# Patient Record
Sex: FEMALE | Race: WHITE | NOT HISPANIC OR LATINO | Employment: STUDENT | URBAN - METROPOLITAN AREA
[De-identification: names, ages, dates, MRNs, and addresses within clinical notes are randomized per-mention and may not be internally consistent; named-entity substitution may affect disease eponyms.]

---

## 2023-05-27 PROBLEM — J02.0 ACUTE STREPTOCOCCAL PHARYNGITIS: Status: RESOLVED | Noted: 2023-05-27 | Resolved: 2023-05-27

## 2023-05-27 PROBLEM — N92.0 HEAVY MENSTRUAL PERIOD: Status: RESOLVED | Noted: 2023-05-27 | Resolved: 2023-05-27

## 2023-05-27 PROBLEM — N39.0 UTI (URINARY TRACT INFECTION): Status: RESOLVED | Noted: 2023-05-27 | Resolved: 2023-05-27

## 2023-05-27 RX ORDER — NORGESTIMATE AND ETHINYL ESTRADIOL 0.25-0.035
1 KIT ORAL DAILY
COMMUNITY
Start: 2022-07-05 | End: 2023-06-06 | Stop reason: SDUPTHER

## 2023-05-30 ENCOUNTER — APPOINTMENT (OUTPATIENT)
Dept: PEDIATRICS | Facility: CLINIC | Age: 23
End: 2023-05-30
Payer: COMMERCIAL

## 2023-06-06 ENCOUNTER — OFFICE VISIT (OUTPATIENT)
Dept: PEDIATRICS | Facility: CLINIC | Age: 23
End: 2023-06-06
Payer: COMMERCIAL

## 2023-06-06 VITALS
SYSTOLIC BLOOD PRESSURE: 130 MMHG | WEIGHT: 265 LBS | BODY MASS INDEX: 39.25 KG/M2 | HEIGHT: 69 IN | DIASTOLIC BLOOD PRESSURE: 79 MMHG | HEART RATE: 61 BPM

## 2023-06-06 DIAGNOSIS — Z00.129 ENCOUNTER FOR ROUTINE CHILD HEALTH EXAMINATION WITHOUT ABNORMAL FINDINGS: Primary | ICD-10-CM

## 2023-06-06 PROCEDURE — 3008F BODY MASS INDEX DOCD: CPT | Performed by: PEDIATRICS

## 2023-06-06 PROCEDURE — 1036F TOBACCO NON-USER: CPT | Performed by: PEDIATRICS

## 2023-06-06 PROCEDURE — 99395 PREV VISIT EST AGE 18-39: CPT | Performed by: PEDIATRICS

## 2023-06-06 RX ORDER — NORGESTIMATE AND ETHINYL ESTRADIOL 0.25-0.035
1 KIT ORAL DAILY
Qty: 84 TABLET | Refills: 3 | Status: SHIPPED | OUTPATIENT
Start: 2023-06-06

## 2023-06-06 NOTE — PROGRESS NOTES
Chanda García is a 22 y.o. here for Wellness Exam    Here with   Herself    Parent/Patient Concerns:  none    Supplements: no    School:    [employer]  Academic performance: good  Peer relationships:  has friends, no issues  Extracurricular activities:  Job: [employer] in [employer]    Nutrition:  Balanced Diet:  yes  Breakfast  yes (egg/toast/cereal)  Lunch: yes at school   Beverages:  water  Fruits/Vegetables:  yes  Meats: yes    Dental: Brushes teeth:  2  times/d  Dental home: yes    Eyeglasses:  no    Safety:  seat belt: yes (drives)      Menstrual Hx:  OCP. Lasts 4-5 days, some cramps     Sexual Activity:  Yes (boyfriend 1 year+ condoms)  Tobacco/Vaping: No  Substance Use:  No      Exam:  General: Alert, interactive. Vital signs reviewed  Skin: no rash, no lesions  Eyes: no redness, no eye drainage, no eyelid swelling. Red Reflex OU, EOMI  Ears: TM right- normal color and landmarks  left- normal color and landmarks  Nose: patent without congestion or  drainage  Mouth/Throat: no lesion. Tonsils without redness or exudate. Symmetrical without enlargement.   Neck: supple, no palpable cervical nodes or masses  Chest: no deformity, swelling, mass, or lesion  Breasts: [place] 5  Lungs: clear to auscultation bilateral  CV: regular rate and rhythm, no murmur  Abdomen: soft, +bowel sounds. No mass, no hepatosplenomegaly, no tenderness to palpation  Extremities: no swelling or deformity. Muscle strength and tone normal x 4. Gait normal   Back: no swelling, no mass.  Scoliosis No   female:  not examined   Neuro:  Muscle strength and tone equal x 4 extremities.  Patellar reflexes equal bilateral.  Gait normal     ASSESSMENT:  Well Adolescent Exam  22  year old    PLAN:  School performance, peer/dating relationships, growth measurements and BMI%, nutrition, and age appropriate exercise reviewed at today's Health Maintenance Visit  Advised to limit  high sugar containing beverages (soda, juice, sports drinks) and excess caffeine  Avoid skipped meals  Avoid excess portions  Recommend a daily  multivitamin    Cleared for sports participation. Sports form reviewed and form signed  H/O Concussion  No     Schedule next Health Maintenance Exam in  1 year

## 2023-08-08 ENCOUNTER — OFFICE VISIT (OUTPATIENT)
Dept: PEDIATRICS | Facility: CLINIC | Age: 23
End: 2023-08-08
Payer: COMMERCIAL

## 2023-08-08 VITALS
DIASTOLIC BLOOD PRESSURE: 81 MMHG | HEART RATE: 66 BPM | SYSTOLIC BLOOD PRESSURE: 118 MMHG | HEIGHT: 68 IN | WEIGHT: 261 LBS | BODY MASS INDEX: 39.56 KG/M2

## 2023-08-08 DIAGNOSIS — F41.1 GENERALIZED ANXIETY DISORDER: Primary | ICD-10-CM

## 2023-08-08 DIAGNOSIS — F41.0 PANIC ATTACKS: ICD-10-CM

## 2023-08-08 PROCEDURE — 1036F TOBACCO NON-USER: CPT | Performed by: PEDIATRICS

## 2023-08-08 PROCEDURE — 99214 OFFICE O/P EST MOD 30 MIN: CPT | Performed by: PEDIATRICS

## 2023-08-08 PROCEDURE — 3008F BODY MASS INDEX DOCD: CPT | Performed by: PEDIATRICS

## 2023-08-08 PROCEDURE — 96127 BRIEF EMOTIONAL/BEHAV ASSMT: CPT | Performed by: PEDIATRICS

## 2023-08-08 RX ORDER — SERTRALINE HYDROCHLORIDE 25 MG/1
TABLET, FILM COATED ORAL
Qty: 30 TABLET | Refills: 1 | Status: SHIPPED | OUTPATIENT
Start: 2023-08-08 | End: 2023-09-11 | Stop reason: DRUGHIGH

## 2023-08-08 RX ORDER — HYDROXYZINE HYDROCHLORIDE 25 MG/1
TABLET, FILM COATED ORAL
Qty: 60 TABLET | Refills: 1 | Status: SHIPPED | OUTPATIENT
Start: 2023-08-08 | End: 2023-10-02 | Stop reason: SDUPTHER

## 2023-08-08 NOTE — PROGRESS NOTES
"   Chief Complaint   Patient presents with    Anxiety        Here with  herself    HPI  Seeing a counselor once weekly while in [facility], started at end of JAN  and stopped over summer but will resume when she returns. It has helped but not entirely and she is concerned about level of symptoms with pending school return  Hard time w/ focus/fidgets/easily distracted/loses train of thought. Sometimes difficult sleep  Panic attack 2 weeks ago. There is not always a predictable trigger  Anxiety dates back to high school  Denies h/o attention problems in grade school, or academic problems     Denies self harm thoughts or behavior          Exam:  /81   Pulse 66   Ht 1.734 m (5' 8.25\")   Wt 118 kg (261 lb)   BMI 39.39 kg/m²   General: Vital signs reviewed, alert, no acute distress  Skin: rash No  Ears: Right TM: normal color and  landmarks   Left TM: normal color and  landmarks   Nose:   no congestion  without drainage  Throat: no lesion, tonsils  + 1  without erythema  Neck: Supple, no swollen nodes  Lungs: clear to auscultation  CV: RR, no murmur    1. Generalized anxiety disorder  2. Panic attacks    SCARED Questionnaire: total 27    Start- sertraline (Zoloft) 25 mg tablet; 1 tablet oral at bedtime  Dispense: 30 tablet; Refill: 1  Start  hydrOXYzine HCL (Atarax) 25 mg tablet; 1 tablet oral in AM and at bedtime  Dispense: 60 tablet; Refill: 1        Discussed possible treatment options including counseling (personal and family) alone, medication alone, and combination treatment  Patient willing to continue  behavioral therapy    Patient would like to proceed with medication . Discussed treatment options including SSRIs such as Prozac, Lexapro, or Zoloft.   SSRIs are typically first line medication in children/adolescents.     Discussed how to take, potential side effects, box warning, etc. Discussed that these medications typically take 4-6 weeks to show signs of improvement. "   Reviewed how important it is to never stop medication without consulting with a physician first.   Discussed expected course of treatment.   Discussed typical follow up schedule when on these medications. All questions from patient/family answered.      Virtual Visit Follow up in 1 month  Call sooner with any concerns/questions

## 2023-08-25 ENCOUNTER — APPOINTMENT (OUTPATIENT)
Dept: PEDIATRICS | Facility: CLINIC | Age: 23
End: 2023-08-25
Payer: COMMERCIAL

## 2023-09-09 RX ORDER — TRETINOIN 1 MG/G
CREAM TOPICAL
COMMUNITY
Start: 2015-06-18 | End: 2024-05-17 | Stop reason: WASHOUT

## 2023-09-11 ENCOUNTER — TELEMEDICINE (OUTPATIENT)
Dept: PEDIATRICS | Facility: CLINIC | Age: 23
End: 2023-09-11
Payer: COMMERCIAL

## 2023-09-11 VITALS — BODY MASS INDEX: 39.24 KG/M2 | WEIGHT: 260 LBS

## 2023-09-11 DIAGNOSIS — F41.1 GENERALIZED ANXIETY DISORDER: Primary | ICD-10-CM

## 2023-09-11 DIAGNOSIS — F41.0 PANIC ATTACKS: ICD-10-CM

## 2023-09-11 PROCEDURE — 99213 OFFICE O/P EST LOW 20 MIN: CPT | Performed by: PEDIATRICS

## 2023-09-11 RX ORDER — SERTRALINE HYDROCHLORIDE 50 MG/1
TABLET, FILM COATED ORAL
Qty: 30 TABLET | Refills: 2 | Status: SHIPPED | OUTPATIENT
Start: 2023-09-11 | End: 2023-10-06

## 2023-09-11 NOTE — PROGRESS NOTES
Chief Complaint   Patient presents with    Follow-up     Medication follow up Anxiety, virtual appointment.         Here with  herself    An interactive audio and video telecommunication system which permits real time communications, between the patient at the originating site and physician at the distant site was utilized to provide this telehealth service.       HPI  Last visit date: 8/8/23  Current Medication(s): Started on sertraline 25 mg at bedtime and hydroxyzine 25 mg twice daily  Reports no adverse events, but does not feel much improved yet  Attending [facility]    Side effects/concerns:  No    Sleep:  unchanged  Appetite: no change        Exam:  Wt 118 kg (260 lb)   BMI 39.24 kg/m²   General: Alert, no acute distress  Visit conducted through Telemedicine   Recorded exam if  pertinent to HPI      1. Generalized anxiety disorder  2. Panic attacks  Increased - sertraline (Zoloft) 50 mg tablet; 1 tablet oral at bedtime  Dispense: 30 tablet; Refill: 2    Continue Hydroxyzine 25 mg in AM and at bedtime  Call after 1 month if dose is still not improving the anxiety symptoms     Follow up in 3-4 months

## 2023-10-02 DIAGNOSIS — F41.0 PANIC ATTACKS: ICD-10-CM

## 2023-10-02 DIAGNOSIS — F41.1 GENERALIZED ANXIETY DISORDER: ICD-10-CM

## 2023-10-02 RX ORDER — HYDROXYZINE HYDROCHLORIDE 25 MG/1
TABLET, FILM COATED ORAL
Qty: 60 TABLET | Refills: 1 | OUTPATIENT
Start: 2023-10-02

## 2023-10-02 RX ORDER — HYDROXYZINE HYDROCHLORIDE 25 MG/1
TABLET, FILM COATED ORAL
Qty: 90 TABLET | Refills: 1 | Status: SHIPPED | OUTPATIENT
Start: 2023-10-02 | End: 2024-01-10

## 2023-10-02 NOTE — TELEPHONE ENCOUNTER
Prescription refilled     1. Generalized anxiety disorder  2. Panic attacks  - hydrOXYzine HCL (Atarax) 25 mg tablet; 1 tablet oral in AM and at bedtime  Dispense: 90 tablet; Refill: 1

## 2023-10-03 DIAGNOSIS — F41.0 PANIC ATTACKS: ICD-10-CM

## 2023-10-03 DIAGNOSIS — F41.1 GENERALIZED ANXIETY DISORDER: ICD-10-CM

## 2023-10-06 RX ORDER — SERTRALINE HYDROCHLORIDE 50 MG/1
TABLET, FILM COATED ORAL
Qty: 90 TABLET | Refills: 1 | Status: SHIPPED | OUTPATIENT
Start: 2023-10-06 | End: 2023-10-16 | Stop reason: DRUGHIGH

## 2023-10-06 NOTE — TELEPHONE ENCOUNTER
I have refilled with 90 days supply      1. Generalized anxiety disorder  2. Panic attacks  - sertraline (Zoloft) 50 mg tablet; TAKE 1 TABLET BY MOUTH EVERYDAY AT BEDTIME  Dispense: 90 tablet; Refill: 1

## 2023-10-10 ENCOUNTER — PATIENT MESSAGE (OUTPATIENT)
Dept: PEDIATRICS | Facility: CLINIC | Age: 23
End: 2023-10-10
Payer: COMMERCIAL

## 2023-10-10 DIAGNOSIS — F41.1 GENERALIZED ANXIETY DISORDER: ICD-10-CM

## 2023-10-10 DIAGNOSIS — F41.0 PANIC ATTACKS: Primary | ICD-10-CM

## 2023-10-10 NOTE — LETTER
October 17, 2023     Patient: Chanda García   YOB: 2000       To Whom It May Concern:    Chanda García has been in my medical care since 11/6/18.  She has the following medical diagnoses:  Diagnosis   • Panic attacks  F41.0   • Generalized anxiety disorder  F41.1     Chanda has been receiving Cognitive Behavioral Therapy.  Treatment with medication has been initiated since August 2023.    Her condition negatively affects her ability to focus, control distractions, and organizational skills, which will impact her educational experience.    Communal living situation will likely exacerbate frequency of panic attacks    I am requesting that Chanda be granted single room accomodation.    If you have any questions or concerns, please don't hesitate to call.     Please contact me if there are any questions.      Sincerely,       Calin Dave MD

## 2023-10-16 RX ORDER — SERTRALINE HYDROCHLORIDE 100 MG/1
TABLET, FILM COATED ORAL
Qty: 30 TABLET | Refills: 1 | Status: SHIPPED | OUTPATIENT
Start: 2023-10-16 | End: 2023-12-11

## 2023-10-16 NOTE — PATIENT COMMUNICATION
Changed prescription dose as follows:    1. Panic attacks  2. Generalized anxiety disorder  - sertraline (Zoloft) 100 mg tablet; 1 tablet oral at bedtime  Dispense: 30 tablet; Refill: 1

## 2023-10-28 DIAGNOSIS — F41.1 GENERALIZED ANXIETY DISORDER: ICD-10-CM

## 2023-10-28 DIAGNOSIS — F41.0 PANIC ATTACKS: ICD-10-CM

## 2023-11-08 DIAGNOSIS — F41.1 GENERALIZED ANXIETY DISORDER: ICD-10-CM

## 2023-11-08 DIAGNOSIS — F41.0 PANIC ATTACKS: ICD-10-CM

## 2023-11-13 ENCOUNTER — PATIENT MESSAGE (OUTPATIENT)
Dept: PEDIATRICS | Facility: CLINIC | Age: 23
End: 2023-11-13
Payer: COMMERCIAL

## 2023-11-13 DIAGNOSIS — G47.9 SLEEP DIFFICULTIES: Primary | ICD-10-CM

## 2023-11-17 PROBLEM — G47.9 SLEEP DIFFICULTIES: Status: ACTIVE | Noted: 2023-11-17

## 2023-11-17 RX ORDER — TRAZODONE HYDROCHLORIDE 50 MG/1
50 TABLET ORAL NIGHTLY
Qty: 90 TABLET | Refills: 0 | Status: SHIPPED | OUTPATIENT
Start: 2023-11-17 | End: 2023-12-26 | Stop reason: ALTCHOICE

## 2023-12-11 RX ORDER — SERTRALINE HYDROCHLORIDE 100 MG/1
TABLET, FILM COATED ORAL
Qty: 90 TABLET | Refills: 0 | Status: SHIPPED | OUTPATIENT
Start: 2023-12-11 | End: 2024-02-22 | Stop reason: SDUPTHER

## 2023-12-11 RX ORDER — SERTRALINE HYDROCHLORIDE 100 MG/1
TABLET, FILM COATED ORAL
Qty: 90 TABLET | Refills: 0 | Status: SHIPPED | OUTPATIENT
Start: 2023-12-11 | End: 2023-12-11 | Stop reason: SDUPTHER

## 2023-12-11 NOTE — TELEPHONE ENCOUNTER
I have refilled the following prescription(s):  CHANGED DISPENSE AMOUNT     1. Panic attacks  2. Generalized anxiety disorder    - sertraline (Zoloft) 100 mg tablet; TAKE 1 TABLET BY MOUTH EVERYDAY AT BEDTIME  Dispense: 90 tablet; Refill: 0

## 2023-12-26 DIAGNOSIS — F41.1 GENERALIZED ANXIETY DISORDER: Primary | ICD-10-CM

## 2023-12-26 RX ORDER — SERTRALINE HYDROCHLORIDE 50 MG/1
TABLET, FILM COATED ORAL
Qty: 90 TABLET | Refills: 0 | Status: SHIPPED | OUTPATIENT
Start: 2023-12-26

## 2024-01-10 RX ORDER — HYDROXYZINE HYDROCHLORIDE 25 MG/1
TABLET, FILM COATED ORAL
Qty: 180 TABLET | Refills: 0 | Status: SHIPPED | OUTPATIENT
Start: 2024-01-10 | End: 2024-02-22 | Stop reason: SDUPTHER

## 2024-02-22 DIAGNOSIS — F41.0 PANIC ATTACKS: ICD-10-CM

## 2024-02-22 DIAGNOSIS — F41.1 GENERALIZED ANXIETY DISORDER: ICD-10-CM

## 2024-02-22 RX ORDER — HYDROXYZINE HYDROCHLORIDE 25 MG/1
TABLET, FILM COATED ORAL
Qty: 180 TABLET | Refills: 0 | Status: SHIPPED | OUTPATIENT
Start: 2024-02-22

## 2024-02-22 RX ORDER — SERTRALINE HYDROCHLORIDE 100 MG/1
TABLET, FILM COATED ORAL
Qty: 90 TABLET | Refills: 0 | Status: SHIPPED | OUTPATIENT
Start: 2024-02-22

## 2024-02-22 NOTE — TELEPHONE ENCOUNTER
I have refilled the following prescription(s):     1. Panic attacks  2. Generalized anxiety disorder  - sertraline (Zoloft) 100 mg tablet; TAKE 1 TABLET BY MOUTH EVERYDAY AT BEDTIME  Dispense: 90 tablet; Refill: 0  - hydrOXYzine HCL (Atarax) 25 mg tablet; 1 TABLET ORAL IN AM AND AT BEDTIME  Dispense: 180 tablet; Refill: 0

## 2024-05-17 ENCOUNTER — OFFICE VISIT (OUTPATIENT)
Dept: PEDIATRICS | Facility: CLINIC | Age: 24
End: 2024-05-17
Payer: COMMERCIAL

## 2024-05-17 VITALS
WEIGHT: 278.25 LBS | SYSTOLIC BLOOD PRESSURE: 126 MMHG | TEMPERATURE: 97.6 F | BODY MASS INDEX: 41.21 KG/M2 | DIASTOLIC BLOOD PRESSURE: 82 MMHG | HEIGHT: 69 IN | HEART RATE: 71 BPM

## 2024-05-17 DIAGNOSIS — F41.1 GENERALIZED ANXIETY DISORDER: ICD-10-CM

## 2024-05-17 DIAGNOSIS — Z23 NEED FOR VACCINATION: ICD-10-CM

## 2024-05-17 DIAGNOSIS — Z00.129 ENCOUNTER FOR ROUTINE CHILD HEALTH EXAMINATION WITHOUT ABNORMAL FINDINGS: Primary | ICD-10-CM

## 2024-05-17 PROBLEM — G47.9 SLEEP DIFFICULTIES: Status: RESOLVED | Noted: 2023-11-17 | Resolved: 2024-05-17

## 2024-05-17 PROCEDURE — 90715 TDAP VACCINE 7 YRS/> IM: CPT | Performed by: PEDIATRICS

## 2024-05-17 PROCEDURE — 3008F BODY MASS INDEX DOCD: CPT | Performed by: PEDIATRICS

## 2024-05-17 PROCEDURE — 90471 IMMUNIZATION ADMIN: CPT | Performed by: PEDIATRICS

## 2024-05-17 PROCEDURE — 99213 OFFICE O/P EST LOW 20 MIN: CPT | Performed by: PEDIATRICS

## 2024-05-17 PROCEDURE — 1036F TOBACCO NON-USER: CPT | Performed by: PEDIATRICS

## 2024-05-17 PROCEDURE — 99395 PREV VISIT EST AGE 18-39: CPT | Performed by: PEDIATRICS

## 2024-05-17 NOTE — PROGRESS NOTES
"Chanda García is a 23 y.o. here for Wellness Exam    Here with  herself    Parent/Patient Concerns:  weight gain over the school year approximate 15 lbs  Last visit for anxiety  several months ago. Taking Sertraline 150 mg at bedtime. Feels like the dose is working well and plans to continue     Supplements: no    School:   [employer] for [employer]. 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 Vital signs reviewed  /82   Pulse 71   Temp 36.4 °C (97.6 °F)   Ht 1.759 m (5' 9.25\")   Wt 126 kg (278 lb 4 oz)   BMI 40.79 kg/m²    Skin: no rash, no lesions  Eyes: no redness, no eye drainage, no eyelid swelling. Red Reflex OU, EOMI  Ears: TM right- normal color and landmarks  left- normal color and landmarks  Nose: patent without congestion or  drainage  Mouth/Throat: no lesion. Tonsils without redness or exudate. Symmetrical without enlargement.   Neck: supple, no palpable cervical nodes or masses  Chest: no deformity, swelling, mass, or lesion  Breasts: [place] 5  Lungs: clear to auscultation bilateral  CV: regular rate and rhythm, no murmur  Abdomen: soft, +bowel sounds. No mass, no hepatosplenomegaly, no tenderness to palpation  Extremities: no swelling or deformity. Muscle strength and tone normal x 4. Gait normal   Back: no swelling, no mass.    female: not examined "   Neuro:  Muscle strength and tone equal x 4 extremities.  Patellar reflexes equal bilateral.  Gait normal     ASSESSMENT:  Well Adult Exam  23  year old  2. Need for vaccination  3. BMI 40.0-44.9, adult (Multi)      PLAN:  - Tdap vaccine, age 7 years and older  Benefits, risks, and side affects of ordered vaccines discussed. VIS statement provided       - Comprehensive Metabolic Panel; Future  - CBC and Auto Differential; Future  - TSH with reflex to Free T4 if abnormal; Future  - Hemoglobin A1C; Future  - Lipid Panel; Future   Discussed referral to Adult Endocrinology to discuss medication treatment options for obesity     School performance, peer/dating relationships, growth measurements and BMI, nutrition, and age appropriate exercise reviewed at today's Health Maintenance Visit  Advised to limit high sugar containing beverages (soda, juice, sports drinks) and excess caffeine  Avoid skipped meals  Avoid excess portions  Recommend a daily  multivitamin      Discussed transition to Adult medicine/Psychiatry [place] turns 24 in [place]  Schedule next Health Maintenance Exam in  1 year

## 2024-05-18 LAB
NON-UH HIE A/G RATIO: 1.1
NON-UH HIE ALB: 3.4 G/DL (ref 3.4–5)
NON-UH HIE ALK PHOS: 84 UNIT/L (ref 45–117)
NON-UH HIE BASO COUNT: 0.03 X1000 (ref 0–0.2)
NON-UH HIE BASOS %: 0.6 %
NON-UH HIE BILIRUBIN, TOTAL: 0.3 MG/DL (ref 0.3–1.2)
NON-UH HIE BUN/CREAT RATIO: 16.2
NON-UH HIE BUN: 13 MG/DL (ref 9–23)
NON-UH HIE CALCIUM: 8.9 MG/DL (ref 8.7–10.4)
NON-UH HIE CALCULATED LDL CHOLESTEROL: 86 MG/DL (ref 60–130)
NON-UH HIE CALCULATED OSMOLALITY: 282 MOSM/KG (ref 275–295)
NON-UH HIE CHLORIDE: 109 MMOL/L (ref 98–107)
NON-UH HIE CHOLESTEROL: 161 MG/DL (ref 100–200)
NON-UH HIE CO2, VENOUS: 24 MMOL/L (ref 20–31)
NON-UH HIE CREATININE: 0.8 MG/DL (ref 0.5–0.8)
NON-UH HIE DIFF?: NO
NON-UH HIE EOS COUNT: 0.16 X1000 (ref 0–0.5)
NON-UH HIE EOSIN %: 3 %
NON-UH HIE GFR AA: >60
NON-UH HIE GLOBULIN: 3.1 G/DL
NON-UH HIE GLOMERULAR FILTRATION RATE: >60 ML/MIN/1.73M?
NON-UH HIE GLUCOSE: 82 MG/DL (ref 74–106)
NON-UH HIE GOT: 16 UNIT/L (ref 15–37)
NON-UH HIE GPT: 16 UNIT/L (ref 10–49)
NON-UH HIE HCT: 38.3 % (ref 36–46)
NON-UH HIE HDL CHOLESTEROL: 58 MG/DL (ref 40–60)
NON-UH HIE HGB A1C: 5.2 %
NON-UH HIE HGB: 12.7 G/DL (ref 12–16)
NON-UH HIE INSTR WBC: 5.6
NON-UH HIE K: 4.3 MMOL/L (ref 3.5–5.1)
NON-UH HIE LYMPH %: 33.6 %
NON-UH HIE LYMPH COUNT: 1.88 X1000 (ref 1.2–4.8)
NON-UH HIE MCH: 27.1 PG (ref 27–34)
NON-UH HIE MCHC: 33.1 G/DL (ref 32–37)
NON-UH HIE MCV: 81.7 FL (ref 80–100)
NON-UH HIE MONO %: 7.8 %
NON-UH HIE MONO COUNT: 0.44 X1000 (ref 0.1–1)
NON-UH HIE MPV: 8.8 FL (ref 7.4–10.4)
NON-UH HIE NA: 142 MMOL/L (ref 135–145)
NON-UH HIE NEUTROPHIL %: 55 %
NON-UH HIE NEUTROPHIL COUNT (ANC): 3.06 X1000 (ref 1.4–8.8)
NON-UH HIE NUCLEATED RBC: 0 /100WBC
NON-UH HIE PLATELET: 233 X10 (ref 150–450)
NON-UH HIE RBC: 4.69 X10 (ref 4.2–5.4)
NON-UH HIE RDW: 14.4 % (ref 11.5–14.5)
NON-UH HIE TOTAL CHOL/HDL CHOL RATIO: 2.8
NON-UH HIE TOTAL PROTEIN: 6.5 G/DL (ref 5.7–8.2)
NON-UH HIE TRIGLYCERIDES: 83 MG/DL (ref 30–150)
NON-UH HIE TSH: 4.76 UIU/ML (ref 0.55–4.78)
NON-UH HIE WBC: 5.6 X10 (ref 4.5–11)

## 2024-07-11 ENCOUNTER — TELEPHONE (OUTPATIENT)
Dept: PEDIATRICS | Facility: CLINIC | Age: 24
End: 2024-07-11
Payer: COMMERCIAL

## 2024-07-11 NOTE — TELEPHONE ENCOUNTER
Lab called because Chanda was there for blood work and diagnosis was BMI which she said it wouldn't work.  I gave her overweight of E66.3.

## 2024-08-14 DIAGNOSIS — F41.1 GENERALIZED ANXIETY DISORDER: ICD-10-CM

## 2024-08-14 DIAGNOSIS — Z00.129 ENCOUNTER FOR ROUTINE CHILD HEALTH EXAMINATION WITHOUT ABNORMAL FINDINGS: ICD-10-CM

## 2024-08-14 DIAGNOSIS — F41.0 PANIC ATTACKS: ICD-10-CM

## 2024-08-16 RX ORDER — SERTRALINE HYDROCHLORIDE 50 MG/1
TABLET, FILM COATED ORAL
Qty: 90 TABLET | Refills: 0 | Status: SHIPPED | OUTPATIENT
Start: 2024-08-16

## 2024-08-16 RX ORDER — SERTRALINE HYDROCHLORIDE 100 MG/1
TABLET, FILM COATED ORAL
Qty: 90 TABLET | Refills: 0 | Status: SHIPPED | OUTPATIENT
Start: 2024-08-16

## 2024-08-16 RX ORDER — HYDROXYZINE HYDROCHLORIDE 25 MG/1
TABLET, FILM COATED ORAL
Qty: 180 TABLET | Refills: 0 | Status: SHIPPED | OUTPATIENT
Start: 2024-08-16

## 2024-08-16 RX ORDER — NORGESTIMATE AND ETHINYL ESTRADIOL 0.25-0.035
1 KIT ORAL DAILY
Qty: 84 TABLET | Refills: 1 | Status: SHIPPED | OUTPATIENT
Start: 2024-08-16

## 2024-08-16 NOTE — TELEPHONE ENCOUNTER
I have refilled the following prescription(s):     1. Encounter for routine child health examination without abnormal findings    - norgestimate-ethinyl estradioL (Ortho-Cyclen) 0.25-35 mg-mcg tablet; Take 1 tablet by mouth once daily.  Dispense: 84 tablet; Refill: 1    2. Generalized anxiety disorder    3. Panic attacks  - sertraline (Zoloft) 50 mg tablet; 1 tablet oral at bedtime along with a the 100 mg tablet  Dispense: 90 tablet; Refill: 0  - sertraline (Zoloft) 100 mg tablet; TAKE 1 TABLET BY MOUTH EVERYDAY AT BEDTIME  Dispense: 90 tablet; Refill: 0  - hydrOXYzine HCL (Atarax) 25 mg tablet; 1 TABLET ORAL IN AM AND AT BEDTIME  Dispense: 180 tablet; Refill: 0

## 2024-10-22 ENCOUNTER — TELEPHONE (OUTPATIENT)
Dept: PEDIATRICS | Facility: CLINIC | Age: 24
End: 2024-10-22
Payer: COMMERCIAL

## 2025-02-14 DIAGNOSIS — F41.0 PANIC ATTACKS: ICD-10-CM

## 2025-02-14 DIAGNOSIS — F41.1 GENERALIZED ANXIETY DISORDER: ICD-10-CM

## 2025-02-14 RX ORDER — SERTRALINE HYDROCHLORIDE 100 MG/1
TABLET, FILM COATED ORAL
Qty: 90 TABLET | Refills: 0 | Status: SHIPPED | OUTPATIENT
Start: 2025-02-14

## 2025-02-14 RX ORDER — SERTRALINE HYDROCHLORIDE 50 MG/1
TABLET, FILM COATED ORAL
Qty: 90 TABLET | Refills: 0 | Status: SHIPPED | OUTPATIENT
Start: 2025-02-14

## 2025-02-14 NOTE — TELEPHONE ENCOUNTER
I have refilled the following prescription(s):     Diagnoses and all orders for this visit:  Generalized anxiety disorder  -     sertraline (Zoloft) 100 mg tablet; TAKE 1 TABLET BY MOUTH EVERYDAY AT BEDTIME  -     sertraline (Zoloft) 50 mg tablet; 1 tablet oral at bedtime along with a the 100 mg tablet

## 2025-05-13 DIAGNOSIS — Z00.129 ENCOUNTER FOR ROUTINE CHILD HEALTH EXAMINATION WITHOUT ABNORMAL FINDINGS: ICD-10-CM

## 2025-05-13 RX ORDER — NORGESTIMATE AND ETHINYL ESTRADIOL 0.25-0.035
1 KIT ORAL DAILY
Qty: 84 TABLET | Refills: 1 | Status: SHIPPED | OUTPATIENT
Start: 2025-05-13

## 2025-05-13 RX ORDER — PHENTERMINE HYDROCHLORIDE 8 MG/1
1 TABLET ORAL DAILY
COMMUNITY

## 2025-05-13 RX ORDER — TOPIRAMATE 25 MG/1
TABLET ORAL
COMMUNITY

## 2025-05-13 NOTE — TELEPHONE ENCOUNTER
I have refilled the following prescription(s):     Diagnoses and all orders for this visit:  Encounter for routine child health examination without abnormal findings  -     norgestimate-ethinyl estradiol (Ortho-Cyclen) 0.25-0.035 mg tablet; Take 1 tablet by mouth once daily.

## 2025-06-05 ENCOUNTER — APPOINTMENT (OUTPATIENT)
Dept: PEDIATRICS | Facility: CLINIC | Age: 25
End: 2025-06-05
Payer: COMMERCIAL

## 2025-06-05 VITALS
BODY MASS INDEX: 40.62 KG/M2 | TEMPERATURE: 98.2 F | WEIGHT: 274.25 LBS | DIASTOLIC BLOOD PRESSURE: 86 MMHG | SYSTOLIC BLOOD PRESSURE: 123 MMHG | HEIGHT: 69 IN | HEART RATE: 76 BPM

## 2025-06-05 DIAGNOSIS — Z00.00 WELL ADULT EXAM: Primary | ICD-10-CM

## 2025-06-05 DIAGNOSIS — F41.1 GENERALIZED ANXIETY DISORDER: ICD-10-CM

## 2025-06-05 DIAGNOSIS — Z13.31 SCREENING FOR DEPRESSION: ICD-10-CM

## 2025-06-05 PROCEDURE — 99213 OFFICE O/P EST LOW 20 MIN: CPT | Performed by: PEDIATRICS

## 2025-06-05 PROCEDURE — 3008F BODY MASS INDEX DOCD: CPT | Performed by: PEDIATRICS

## 2025-06-05 PROCEDURE — 96127 BRIEF EMOTIONAL/BEHAV ASSMT: CPT | Performed by: PEDIATRICS

## 2025-06-05 PROCEDURE — 99395 PREV VISIT EST AGE 18-39: CPT | Performed by: PEDIATRICS

## 2025-06-05 PROCEDURE — 1036F TOBACCO NON-USER: CPT | Performed by: PEDIATRICS

## 2025-06-05 RX ORDER — SERTRALINE HYDROCHLORIDE 100 MG/1
TABLET, FILM COATED ORAL
Qty: 90 TABLET | Refills: 3 | Status: SHIPPED | OUTPATIENT
Start: 2025-06-05 | End: 2026-06-05

## 2025-06-05 RX ORDER — SERTRALINE HYDROCHLORIDE 50 MG/1
TABLET, FILM COATED ORAL
Qty: 90 TABLET | Refills: 3 | Status: SHIPPED | OUTPATIENT
Start: 2025-06-05 | End: 2026-06-05

## 2025-06-05 RX ORDER — NORGESTIMATE AND ETHINYL ESTRADIOL 0.25-0.035
1 KIT ORAL DAILY
Qty: 84 TABLET | Refills: 3 | Status: SHIPPED | OUTPATIENT
Start: 2025-06-05

## 2025-06-05 ASSESSMENT — PATIENT HEALTH QUESTIONNAIRE - PHQ9
10. IF YOU CHECKED OFF ANY PROBLEMS, HOW DIFFICULT HAVE THESE PROBLEMS MADE IT FOR YOU TO DO YOUR WORK, TAKE CARE OF THINGS AT HOME, OR GET ALONG WITH OTHER PEOPLE: NOT DIFFICULT AT ALL
6. FEELING BAD ABOUT YOURSELF - OR THAT YOU ARE A FAILURE OR HAVE LET YOURSELF OR YOUR FAMILY DOWN: NOT AT ALL
3. TROUBLE FALLING OR STAYING ASLEEP: SEVERAL DAYS
SUM OF ALL RESPONSES TO PHQ QUESTIONS 1-9: 4
6. FEELING BAD ABOUT YOURSELF - OR THAT YOU ARE A FAILURE OR HAVE LET YOURSELF OR YOUR FAMILY DOWN: NOT AT ALL
SUM OF ALL RESPONSES TO PHQ9 QUESTIONS 1 & 2: 0
8. MOVING OR SPEAKING SO SLOWLY THAT OTHER PEOPLE COULD HAVE NOTICED. OR THE OPPOSITE - BEING SO FIDGETY OR RESTLESS THAT YOU HAVE BEEN MOVING AROUND A LOT MORE THAN USUAL: NOT AT ALL
1. LITTLE INTEREST OR PLEASURE IN DOING THINGS: NOT AT ALL
3. TROUBLE FALLING OR STAYING ASLEEP OR SLEEPING TOO MUCH: SEVERAL DAYS
9. THOUGHTS THAT YOU WOULD BE BETTER OFF DEAD, OR OF HURTING YOURSELF: NOT AT ALL
5. POOR APPETITE OR OVEREATING: NOT AT ALL
4. FEELING TIRED OR HAVING LITTLE ENERGY: SEVERAL DAYS
7. TROUBLE CONCENTRATING ON THINGS, SUCH AS READING THE NEWSPAPER OR WATCHING TELEVISION: MORE THAN HALF THE DAYS
7. TROUBLE CONCENTRATING ON THINGS, SUCH AS READING THE NEWSPAPER OR WATCHING TELEVISION: MORE THAN HALF THE DAYS
2. FEELING DOWN, DEPRESSED OR HOPELESS: NOT AT ALL
8. MOVING OR SPEAKING SO SLOWLY THAT OTHER PEOPLE COULD HAVE NOTICED. OR THE OPPOSITE, BEING SO FIGETY OR RESTLESS THAT YOU HAVE BEEN MOVING AROUND A LOT MORE THAN USUAL: NOT AT ALL
10. IF YOU CHECKED OFF ANY PROBLEMS, HOW DIFFICULT HAVE THESE PROBLEMS MADE IT FOR YOU TO DO YOUR WORK, TAKE CARE OF THINGS AT HOME, OR GET ALONG WITH OTHER PEOPLE: NOT DIFFICULT AT ALL
2. FEELING DOWN, DEPRESSED OR HOPELESS: NOT AT ALL
1. LITTLE INTEREST OR PLEASURE IN DOING THINGS: NOT AT ALL
4. FEELING TIRED OR HAVING LITTLE ENERGY: SEVERAL DAYS
9. THOUGHTS THAT YOU WOULD BE BETTER OFF DEAD, OR OF HURTING YOURSELF: NOT AT ALL
5. POOR APPETITE OR OVEREATING: NOT AT ALL

## 2025-06-05 NOTE — PROGRESS NOTES
Alisson Armas is a 24 y.o. female who presents today for her Health Maintenance and Supervision Exam.    History provided today by  Patient     General Health:  Last visit 2/21/25  Taking Sertraline 150 mg oral daily. Dose is working well and desires to continue  Taking OCP and wants to continue     Followed by [facility] for Obesity management, has a follow up appointment next month     Social and Family History:  At home, there have been no interval changes.    Development/Education:    Graduated from [employer] with a Bachelor of Arts  [employer] in Fall for OT  Academic performance:  good    Job:  Yes    Behavior/Socialization:  Lives with parents over the summer  Normal peer relationships? Yes      Questionnaires:    Patient Health Questionnaire-Depression Screening (Phq-9)    6/5/2025  3:09 PM EDT - Filed by Patient   Over the last 2 weeks, how often have you been bothered by any of the following problems?    Little interest or pleasure in doing things Not at all   Feeling down, depressed, or hopeless Not at all   Trouble falling or staying asleep, or sleeping too much Several days   Feeling tired or having little energy Several days   Poor appetite or overeating Not at all   Feeling bad about yourself - or that you are a failure or have let yourself or your family down Not at all   Trouble concentrating on things, such as reading the newspaper or watching television More than half the days   Moving or speaking so slowly that other people could have noticed? Or the opposite - being so fidgety or restless that you have been moving around a lot more than usual. Not at all   Thoughts that you would be better off dead or hurting yourself in some way Not at all   If you checked off any problems on this questionnaire, how difficult have these problems made it for you to do your work, take care of things at home, or get along with other people? Not difficult at all  "     Asq-Ask Suicide-Screening Questions    6/5/2025  3:09 PM EDT - Filed by Patient   In the past few weeks, have you wished you were dead? No   In the past few weeks, have you felt that you or your family would be better off if you were dead? No   In the past week, have you been having thoughts about killing yourself? No   Have you ever tried to kill yourself? No               Nutrition: Balanced diet?  Yes  Supplements: no  Skipped meals? :    maybe breakfast   Beverages:  mainly water  Current Diet: A variety of meats, vegetables, fruits  yes  Concerns about body image? yes      Dental Care:  Chanda has a dental home? Yes  Dental hygiene regularly performed? Yes    Eyeglasses:  yes    Sleep:  Sleep problems: no     Menstrual Status:  regular every 28-30 days      4 days      Sexual History:  Dating? yes boyfriend 3 years (). He will be moving to [place]  Sexual Activity:  Yes    Drugs:  Tobacco? no  Uses drugs? No  Vaping? no    Risk Assessment:  Additional health risks: No    Safety Assessment:  Safety topics reviewed: Yes  Uses safety belts? Yes   Working Smoke detectors/carbon monoxide detectors Yes   Secondhand smoke? No   Nonviolent home? Yes             Exam:  General: Alert, interactive. Vital signs reviewed  /86   Pulse 76   Temp 36.8 °C (98.2 °F)   Ht 1.74 m (5' 8.5\")   Wt 124 kg (274 lb 4 oz)   BMI 41.09 kg/m²   Skin:  skin color, texture and turgor are normal; no bruising, rashes or lesions noted  Eyes: no redness, no eye drainage, no eyelid swelling. Red Reflex OU, EOMI  Ears: TM right- normal color and landmarks  left- normal color and landmarks  Nose: patent without congestion or  drainage  Mouth/Throat: no lesion. Tonsils without redness or exudate. Symmetrical without enlargement.   Neck: supple, no palpable cervical nodes or masses  Chest: no deformity, swelling, mass, or lesion  Breasts: [place] 5  Lungs: clear to auscultation bilateral  CV: regular rate and rhythm, no " murmur  Abdomen: soft, +bowel sounds. No mass, no hepatosplenomegaly, no tenderness to palpation  Extremities: no swelling or deformity. Muscle strength and tone normal x 4. Gait normal   Back: no swelling, no mass.     female: not examined   Neuro:  Muscle strength and tone equal x 4 extremities.  Patellar reflexes equal bilateral.  Gait normal     ASSESSMENT:  Well Exam  24  year old    Diagnoses and all orders for this visit:  Well adult exam  -     1 Year Follow Up; Future  (Internal medicine/Family medicine)  REFILL -     norgestimate-ethinyl estradiol (Ortho-Cyclen) 0.25-0.035 mg tablet; Take 1 tablet by mouth once daily.  Generalized anxiety disorder    REFILL -     sertraline (Zoloft) 100 mg tablet; TAKE 1 TABLET BY MOUTH EVERYDAY AT BEDTIME  REFILL -     sertraline (Zoloft) 50 mg tablet; 1 tablet oral at bedtime along with a the 100 mg tablet    Screening for depression    School/job,  peer/dating relationships, growth measurements and BMI, nutrition, and age appropriate exercise reviewed at today's Health Maintenance Visit  Advised to limit high sugar containing beverages (soda, juice, sports drinks) and excess caffeine  Avoid skipped meals  Avoid excess portions     Questionnaires reviewed during this visit: ASQ and PHQ-9      PHQ 2/9 questionnaire:   Score: 4  Risk factors : No    ASQ Risk Score:  No intervention necessary      Schedule next Health Maintenance Exam in  1 year